# Patient Record
Sex: FEMALE | Race: WHITE | NOT HISPANIC OR LATINO | URBAN - METROPOLITAN AREA
[De-identification: names, ages, dates, MRNs, and addresses within clinical notes are randomized per-mention and may not be internally consistent; named-entity substitution may affect disease eponyms.]

---

## 2023-06-20 ENCOUNTER — OFFICE (OUTPATIENT)
Dept: URBAN - METROPOLITAN AREA CLINIC 19 | Facility: CLINIC | Age: 48
End: 2023-06-20

## 2023-06-20 VITALS
OXYGEN SATURATION: 100 % | HEART RATE: 60 BPM | HEIGHT: 69 IN | WEIGHT: 220 LBS | SYSTOLIC BLOOD PRESSURE: 134 MMHG | DIASTOLIC BLOOD PRESSURE: 81 MMHG

## 2023-06-20 DIAGNOSIS — R10.13 EPIGASTRIC PAIN: ICD-10-CM

## 2023-06-20 DIAGNOSIS — Z90.49 ACQUIRED ABSENCE OF OTHER SPECIFIED PARTS OF DIGESTIVE TRACT: ICD-10-CM

## 2023-06-20 DIAGNOSIS — R14.0 ABDOMINAL DISTENSION (GASEOUS): ICD-10-CM

## 2023-06-20 DIAGNOSIS — R63.5 ABNORMAL WEIGHT GAIN: ICD-10-CM

## 2023-06-20 DIAGNOSIS — K52.9 NONINFECTIVE GASTROENTERITIS AND COLITIS, UNSPECIFIED: ICD-10-CM

## 2023-06-20 PROCEDURE — 99204 OFFICE O/P NEW MOD 45 MIN: CPT | Performed by: NURSE PRACTITIONER

## 2023-06-20 RX ORDER — OMEPRAZOLE 40 MG/1
CAPSULE, DELAYED RELEASE ORAL
Qty: 30 | Refills: 0 | Status: ACTIVE

## 2023-06-20 RX ORDER — SODIUM PICOSULFATE, MAGNESIUM OXIDE, AND ANHYDROUS CITRIC ACID 10; 3.5; 12 MG/160ML; G/160ML; G/160ML
LIQUID ORAL
Qty: 350 | Refills: 0 | Status: COMPLETED
Start: 2023-06-20 | End: 2023-11-20

## 2023-06-20 NOTE — SERVICENOTES
She wants to do a full dose PPI trial first before EGD. Will see her in 8 weeks to assess symptoms. Sent to Dr. Washington for review. Did not order Colestipol yet, diarrhea better when starting probiotic.

## 2023-06-20 NOTE — SERVICEHPINOTES
Ms. Avalos is a 48-year-old female with PMH including asthma, HLD, gallstones s/p cholecystectomy Jan 2022, GERD on PPI daily, colonoscopy in 2006 for rectal bleeding.
br
sarbjit She presents to clinic today as a referral from Farida Nation NP for colonoscopy, bloating, diarrhea. 
br
br   She had the onset of diarrhea 1.5 mo ago, immediately postprandial in the morning. Describes as liquid, loose. She will typically not have another stool the rest of the day.
br
br She has early satiety, intermittent heartburn on omeprazole 20 mg in a.m., bloating, weight gain, mild nausea, feeling of "bubbles" in the lower abdomen at times. br
br No fever, rashes, weight loss, rectal pain, hematochezia, vomiting, melena, AP, urinary symptoms, constipation, dysphagia, odynophagia.
br
br No travel, NSAID use, or abx. She recently started a probiotic which is helping her. No well water at home. 
br
br
GF with vocal cord CA. No esophageal, colon CA in family.  She is eating much healthier, eliminating carbonated beverages/soda.

## 2023-11-20 ENCOUNTER — AMBULATORY SURGICAL CENTER (OUTPATIENT)
Dept: URBAN - METROPOLITAN AREA SURGERY 2 | Facility: SURGERY | Age: 48
End: 2023-11-20
Payer: COMMERCIAL

## 2023-11-20 ENCOUNTER — OFFICE (OUTPATIENT)
Dept: URBAN - METROPOLITAN AREA PATHOLOGY 12 | Facility: PATHOLOGY | Age: 48
End: 2023-11-20
Payer: COMMERCIAL

## 2023-11-20 VITALS
SYSTOLIC BLOOD PRESSURE: 124 MMHG | HEART RATE: 65 BPM | HEART RATE: 69 BPM | OXYGEN SATURATION: 96 % | HEIGHT: 69 IN | TEMPERATURE: 97.6 F | RESPIRATION RATE: 18 BRPM | SYSTOLIC BLOOD PRESSURE: 176 MMHG | OXYGEN SATURATION: 94 % | DIASTOLIC BLOOD PRESSURE: 63 MMHG | SYSTOLIC BLOOD PRESSURE: 133 MMHG | HEART RATE: 62 BPM | SYSTOLIC BLOOD PRESSURE: 134 MMHG | OXYGEN SATURATION: 94 % | RESPIRATION RATE: 16 BRPM | OXYGEN SATURATION: 92 % | OXYGEN SATURATION: 92 % | HEART RATE: 95 BPM | RESPIRATION RATE: 16 BRPM | DIASTOLIC BLOOD PRESSURE: 81 MMHG | OXYGEN SATURATION: 96 % | TEMPERATURE: 98.3 F | OXYGEN SATURATION: 91 % | HEART RATE: 95 BPM | OXYGEN SATURATION: 96 % | HEART RATE: 85 BPM | OXYGEN SATURATION: 91 % | HEART RATE: 69 BPM | SYSTOLIC BLOOD PRESSURE: 116 MMHG | DIASTOLIC BLOOD PRESSURE: 81 MMHG | RESPIRATION RATE: 17 BRPM | SYSTOLIC BLOOD PRESSURE: 133 MMHG | SYSTOLIC BLOOD PRESSURE: 116 MMHG | RESPIRATION RATE: 18 BRPM | HEART RATE: 95 BPM | DIASTOLIC BLOOD PRESSURE: 98 MMHG | DIASTOLIC BLOOD PRESSURE: 63 MMHG | TEMPERATURE: 97.6 F | SYSTOLIC BLOOD PRESSURE: 176 MMHG | HEART RATE: 65 BPM | WEIGHT: 213 LBS | WEIGHT: 213 LBS | OXYGEN SATURATION: 67 % | DIASTOLIC BLOOD PRESSURE: 98 MMHG | OXYGEN SATURATION: 94 % | SYSTOLIC BLOOD PRESSURE: 124 MMHG | OXYGEN SATURATION: 67 % | TEMPERATURE: 98.3 F | SYSTOLIC BLOOD PRESSURE: 176 MMHG | TEMPERATURE: 97.6 F | DIASTOLIC BLOOD PRESSURE: 65 MMHG | RESPIRATION RATE: 16 BRPM | RESPIRATION RATE: 17 BRPM | HEART RATE: 65 BPM | SYSTOLIC BLOOD PRESSURE: 124 MMHG | HEART RATE: 62 BPM | DIASTOLIC BLOOD PRESSURE: 81 MMHG | HEART RATE: 69 BPM | OXYGEN SATURATION: 67 % | OXYGEN SATURATION: 92 % | TEMPERATURE: 98.3 F | RESPIRATION RATE: 18 BRPM | HEIGHT: 69 IN | HEART RATE: 85 BPM | WEIGHT: 213 LBS | DIASTOLIC BLOOD PRESSURE: 65 MMHG | SYSTOLIC BLOOD PRESSURE: 134 MMHG | SYSTOLIC BLOOD PRESSURE: 134 MMHG | DIASTOLIC BLOOD PRESSURE: 98 MMHG | HEART RATE: 62 BPM | OXYGEN SATURATION: 91 % | DIASTOLIC BLOOD PRESSURE: 65 MMHG | RESPIRATION RATE: 17 BRPM | DIASTOLIC BLOOD PRESSURE: 63 MMHG | SYSTOLIC BLOOD PRESSURE: 116 MMHG | HEIGHT: 69 IN | HEART RATE: 85 BPM | SYSTOLIC BLOOD PRESSURE: 133 MMHG

## 2023-11-20 DIAGNOSIS — D12.2 BENIGN NEOPLASM OF ASCENDING COLON: ICD-10-CM

## 2023-11-20 DIAGNOSIS — D12.3 BENIGN NEOPLASM OF TRANSVERSE COLON: ICD-10-CM

## 2023-11-20 DIAGNOSIS — K57.30 DIVERTICULOSIS OF LARGE INTESTINE WITHOUT PERFORATION OR ABS: ICD-10-CM

## 2023-11-20 DIAGNOSIS — Z12.11 ENCOUNTER FOR SCREENING FOR MALIGNANT NEOPLASM OF COLON: ICD-10-CM

## 2023-11-20 DIAGNOSIS — K63.5 POLYP OF COLON: ICD-10-CM

## 2023-11-20 DIAGNOSIS — D12.4 BENIGN NEOPLASM OF DESCENDING COLON: ICD-10-CM

## 2023-11-20 PROCEDURE — 45380 COLONOSCOPY AND BIOPSY: CPT | Mod: 33 | Performed by: INTERNAL MEDICINE

## 2023-11-20 PROCEDURE — 88305 TISSUE EXAM BY PATHOLOGIST: CPT | Mod: TC | Performed by: STUDENT IN AN ORGANIZED HEALTH CARE EDUCATION/TRAINING PROGRAM

## 2023-11-27 LAB
GASTRO ONE PATHOLOGY: PDF REPORT: (no result)